# Patient Record
Sex: MALE | Race: WHITE | NOT HISPANIC OR LATINO
[De-identification: names, ages, dates, MRNs, and addresses within clinical notes are randomized per-mention and may not be internally consistent; named-entity substitution may affect disease eponyms.]

---

## 2020-08-04 ENCOUNTER — APPOINTMENT (OUTPATIENT)
Dept: OPHTHALMOLOGY | Facility: CLINIC | Age: 70
End: 2020-08-04
Payer: MEDICARE

## 2020-08-04 ENCOUNTER — NON-APPOINTMENT (OUTPATIENT)
Age: 70
End: 2020-08-04

## 2020-08-04 PROBLEM — Z00.00 ENCOUNTER FOR PREVENTIVE HEALTH EXAMINATION: Status: ACTIVE | Noted: 2020-08-04

## 2020-08-04 PROCEDURE — 92060 SENSORIMOTOR EXAMINATION: CPT

## 2020-08-04 PROCEDURE — 92004 COMPRE OPH EXAM NEW PT 1/>: CPT

## 2020-09-16 ENCOUNTER — NON-APPOINTMENT (OUTPATIENT)
Age: 70
End: 2020-09-16

## 2020-09-16 ENCOUNTER — APPOINTMENT (OUTPATIENT)
Dept: OPHTHALMOLOGY | Facility: CLINIC | Age: 70
End: 2020-09-16
Payer: MEDICARE

## 2020-09-16 PROCEDURE — 92012 INTRM OPH EXAM EST PATIENT: CPT

## 2020-10-21 ENCOUNTER — APPOINTMENT (OUTPATIENT)
Dept: OPHTHALMOLOGY | Facility: CLINIC | Age: 70
End: 2020-10-21
Payer: MEDICARE

## 2020-10-21 ENCOUNTER — NON-APPOINTMENT (OUTPATIENT)
Age: 70
End: 2020-10-21

## 2020-10-21 PROCEDURE — 92012 INTRM OPH EXAM EST PATIENT: CPT

## 2020-10-21 PROCEDURE — 92060 SENSORIMOTOR EXAMINATION: CPT

## 2020-10-21 PROCEDURE — 99072 ADDL SUPL MATRL&STAF TM PHE: CPT

## 2020-11-13 ENCOUNTER — APPOINTMENT (OUTPATIENT)
Dept: OPHTHALMOLOGY | Facility: CLINIC | Age: 70
End: 2020-11-13
Payer: MEDICARE

## 2020-11-13 ENCOUNTER — NON-APPOINTMENT (OUTPATIENT)
Age: 70
End: 2020-11-13

## 2020-11-13 PROCEDURE — 92012 INTRM OPH EXAM EST PATIENT: CPT

## 2020-11-13 PROCEDURE — 99072 ADDL SUPL MATRL&STAF TM PHE: CPT

## 2020-11-13 PROCEDURE — 92060 SENSORIMOTOR EXAMINATION: CPT

## 2020-12-14 ENCOUNTER — APPOINTMENT (OUTPATIENT)
Dept: OPHTHALMOLOGY | Facility: CLINIC | Age: 70
End: 2020-12-14
Payer: MEDICARE

## 2020-12-14 ENCOUNTER — NON-APPOINTMENT (OUTPATIENT)
Age: 70
End: 2020-12-14

## 2020-12-14 PROCEDURE — 92060 SENSORIMOTOR EXAMINATION: CPT

## 2020-12-14 PROCEDURE — 99072 ADDL SUPL MATRL&STAF TM PHE: CPT

## 2020-12-14 PROCEDURE — 99214 OFFICE O/P EST MOD 30 MIN: CPT

## 2021-08-25 ENCOUNTER — APPOINTMENT (OUTPATIENT)
Dept: OPHTHALMOLOGY | Facility: CLINIC | Age: 71
End: 2021-08-25

## 2021-09-13 ENCOUNTER — APPOINTMENT (OUTPATIENT)
Dept: OPHTHALMOLOGY | Facility: CLINIC | Age: 71
End: 2021-09-13
Payer: MEDICARE

## 2021-09-13 ENCOUNTER — NON-APPOINTMENT (OUTPATIENT)
Age: 71
End: 2021-09-13

## 2021-09-13 PROCEDURE — 92012 INTRM OPH EXAM EST PATIENT: CPT

## 2022-06-01 ENCOUNTER — NON-APPOINTMENT (OUTPATIENT)
Age: 72
End: 2022-06-01

## 2022-06-01 ENCOUNTER — APPOINTMENT (OUTPATIENT)
Dept: OPHTHALMOLOGY | Facility: CLINIC | Age: 72
End: 2022-06-01
Payer: MEDICARE

## 2022-06-01 PROCEDURE — 92012 INTRM OPH EXAM EST PATIENT: CPT

## 2022-08-30 ENCOUNTER — APPOINTMENT (OUTPATIENT)
Dept: ORTHOPEDIC SURGERY | Facility: CLINIC | Age: 72
End: 2022-08-30

## 2022-08-30 DIAGNOSIS — Z86.39 PERSONAL HISTORY OF OTHER ENDOCRINE, NUTRITIONAL AND METABOLIC DISEASE: ICD-10-CM

## 2022-08-30 DIAGNOSIS — G70.00 MYASTHENIA GRAVIS W/OUT (ACUTE) EXACERBATION: ICD-10-CM

## 2022-08-30 DIAGNOSIS — M51.9 UNSPECIFIED THORACIC, THORACOLUMBAR AND LUMBOSACRAL INTERVERTEBRAL DISC DISORDER: ICD-10-CM

## 2022-08-30 PROCEDURE — 99204 OFFICE O/P NEW MOD 45 MIN: CPT

## 2022-08-30 PROCEDURE — 72100 X-RAY EXAM L-S SPINE 2/3 VWS: CPT

## 2022-08-30 PROCEDURE — 72170 X-RAY EXAM OF PELVIS: CPT

## 2022-08-30 RX ORDER — METFORMIN HYDROCHLORIDE 1000 MG/1
1000 TABLET, COATED ORAL
Refills: 0 | Status: ACTIVE | COMMUNITY

## 2022-08-30 RX ORDER — DICLOFENAC SODIUM 75 MG/1
75 TABLET, DELAYED RELEASE ORAL
Qty: 60 | Refills: 1 | Status: ACTIVE | COMMUNITY
Start: 2022-08-30 | End: 1900-01-01

## 2022-08-30 RX ORDER — LEVOTHYROXINE SODIUM 88 UG/1
88 TABLET ORAL
Refills: 0 | Status: ACTIVE | COMMUNITY

## 2022-08-30 NOTE — HISTORY OF PRESENT ILLNESS
[Sudden] : sudden [8] : 8 [2] : 2 [Dull/Aching] : dull/aching [Walking] : walking [Lying in bed] : lying in bed [de-identified] : Patient presents with right hip pain for the past 5-6 weeks. No specific injury. Describes right glut pain that occurs mostly after rising from a sitting or laying. Denies radiating pain. Improves with activity and Tylenol #3. Denies history of prior injury. [] : no [FreeTextEntry1] : right hip [FreeTextEntry5] : no recent injury. Patient is a 71 year old male here today for ongoing right hip pain that started 5 weeks ago. Patient complains about soreness and dull pain that worsens when walking. Pain worsens at nighttime. [FreeTextEntry6] : soreness [FreeTextEntry9] : tylenol [de-identified] : none

## 2022-08-30 NOTE — PHYSICAL EXAM
[NL (30)] : left lateral rotation 30 degrees [5___] : right hamstring 5[unfilled]/5 [] : no ecchymosis [TWNoteComboBox7] : forward flexion 75 degrees [de-identified] : extension 20 degrees

## 2022-08-30 NOTE — DISCUSSION/SUMMARY
[de-identified] : The patient was advised of the diagnosis. The natural history of the pathology was explained in full to the patient in layman's terms. All questions were answered. The risks and benefits of surgical and non-surgical treatment alternatives were explained in full to the patient.\par \par \par May need mri and may need pain management. all discussed\par

## 2022-09-27 ENCOUNTER — APPOINTMENT (OUTPATIENT)
Dept: ORTHOPEDIC SURGERY | Facility: CLINIC | Age: 72
End: 2022-09-27

## 2022-09-27 VITALS — BODY MASS INDEX: 36.86 KG/M2 | HEIGHT: 63 IN | WEIGHT: 208 LBS

## 2022-09-27 PROCEDURE — 99214 OFFICE O/P EST MOD 30 MIN: CPT | Mod: 25

## 2022-09-27 PROCEDURE — J3490M: CUSTOM

## 2022-09-27 PROCEDURE — 20550 NJX 1 TENDON SHEATH/LIGAMENT: CPT

## 2022-09-27 PROCEDURE — 73130 X-RAY EXAM OF HAND: CPT | Mod: LT

## 2022-09-27 NOTE — PROCEDURE
[FreeTextEntry3] : Injection was performed in the left thumb because of pain and inflammation. Anesthesia: ethyl chloride sprayed topically. left thumb A1 pulley\par Kenalog: An injection of Kenalog 7.5 mg.\par Marcaine .25%: 1.5 cc.\par \par The risks, benefits, and alternatives to cortisone injection were explained in full to the patient. Risks outlined include but are not limited to infection, sepsis, bleeding, scarring, skin discoloration, temporary increase in pain, syncopal episode, failure to resolve symptoms, allergic reaction, and symptom recurrence. Patient understands the risks. All questions were answered. After discussion of options, patient requested an injection. Oral informed consent was obtained. Sterile technique was utilized for the procedure including the preparation of the solutions used for the injection. Injection site was prepped with betadine and alcohol. Ethyl chloride sprayed topically. Sterile technique used. Patient tolerated procedure well. \par \par Post Procedure Instructions: Patient was advised to call if redness, pain, or fever occur. Apply ice for 15 min. every 2 hours for the next 12-24 hours as tolerated. Patient was advised to rest the joint(s) for 1-2 days.\par

## 2022-09-27 NOTE — DISCUSSION/SUMMARY
[de-identified] : The patient was advised of the diagnosis. The natural history of the pathology was explained in full to the patient in layman's terms. All questions were answered. The risks and benefits of surgical and non-surgical treatment alternatives were explained in full to the patient.\par \par The risks, benefits, and alternatives to corticosteroid injection were reviewed with the patient. Risks outlined include, but are not limited to infection, sepsis, bleeding, scarring, skin discoloration, temporary increase in pain, syncopal episode, failure to resolve symptoms, symptoms recurrence, allergic reaction, flare reaction, and elevation of blood sugar in diabetics. Patient understood the risks and asked to proceed with this treatment course.\par

## 2022-09-27 NOTE — PHYSICAL EXAM
[Left] : left hand [A1-Pulley] : A1-pulley [Finger Flexion] : finger flexion [5___] : grasp 5[unfilled]/5 [1st] : 1st [] : no erythema

## 2022-09-27 NOTE — HISTORY OF PRESENT ILLNESS
[de-identified] : RHD patient presents with left thumb pain for the past 3-4 weeks. No specific injury. Describes pain at the base of the thumb that is worse with grasping. Often drops things. Denies numbness/tingling. He has been using trying ice and NSAIDs without relief. History of RA.\par \par Pain in the lower back and right hip has improved. He has been going to PT.

## 2022-09-27 NOTE — IMAGING
[Left] : left hand [There are no fractures, subluxations or dislocations. No significant abnormalities are seen] : There are no fractures, subluxations or dislocations. No significant abnormalities are seen [FreeTextEntry1] : minimal oa mid carpus

## 2022-10-13 ENCOUNTER — APPOINTMENT (OUTPATIENT)
Dept: ORTHOPEDIC SURGERY | Facility: CLINIC | Age: 72
End: 2022-10-13

## 2022-10-13 VITALS — HEIGHT: 63 IN | WEIGHT: 208 LBS | BODY MASS INDEX: 36.86 KG/M2

## 2022-10-13 PROCEDURE — 99213 OFFICE O/P EST LOW 20 MIN: CPT

## 2022-10-13 NOTE — PHYSICAL EXAM
[Left] : left hand [A1-Pulley] : A1-pulley [Finger Flexion] : finger flexion [5___] : grasp 5[unfilled]/5 [1st] : 1st [] : no erythema [de-identified] : note no definite click, x-ray no arthritic changes.

## 2022-10-13 NOTE — HISTORY OF PRESENT ILLNESS
[8] : 8 [de-identified] : Follow up today. Symptoms continue. Notes only 1-2 day of relief following CSI at the last visit (9/27/22). States no longer locking, but pain is significant. He has been wearing brace. [FreeTextEntry1] : left hand

## 2022-10-13 NOTE — DISCUSSION/SUMMARY
[de-identified] : The patient was advised of the diagnosis. The natural history of the pathology was explained in full to the patient in layman's terms. All questions were answered. The risks and benefits of surgical and non-surgical treatment alternatives were explained in full to the patient.\par \par advised to see Dr Raphael, trigger finger vs oa discussed but does have pain over A1 pulley but no definite click , did not improve from injection A1 pulley

## 2022-10-27 ENCOUNTER — APPOINTMENT (OUTPATIENT)
Dept: ORTHOPEDIC SURGERY | Facility: CLINIC | Age: 72
End: 2022-10-27

## 2022-10-27 PROCEDURE — 20550 NJX 1 TENDON SHEATH/LIGAMENT: CPT | Mod: LT

## 2022-10-27 PROCEDURE — 99214 OFFICE O/P EST MOD 30 MIN: CPT | Mod: 25

## 2022-10-27 PROCEDURE — L3809: CPT | Mod: LT

## 2022-10-27 NOTE — HISTORY OF PRESENT ILLNESS
[8] : 8 [de-identified] : 10/27/2022: Pt is a 71 year old M who presents today for consultation of their  L Thumb. Pt was seen by [facility] Dr. Nichole since 9/27/22 with left thumb pain for the prior 3-4 weeks w/o specific injury. Pain at the base of the thumb that is worse with grasping. Often drops things. Denies numbness/tingling. Ice/NSAIDs w/o relief. H/o of RA. XR showed there were no fractures, subluxations, or dislocations. No significant abnormalities were seen. Given CSI w/ only 2 days of relief, using brace. Advised to see Dr Raphael, trigger finger vs oa discussed but does have pain over A1 pulley but no definite click, did not improve from injection A1 pulley. \par \par PMH: myasthenia occula (currently on low dose prednisone), htn, RA. \par Allergies: Sulfa (rash). \par \par  [FreeTextEntry1] : left thumb

## 2022-10-27 NOTE — ASSESSMENT
[FreeTextEntry1] : The patient was advised of the diagnosis. The natural history of the pathology was explained in full to the patient in layman's terms. All questions were answered. The risks and benefits of surgical and non-surgical treatment alternatives were explained in full to the patient.\par \par Pt will wear gamekeeper

## 2022-10-27 NOTE — PROCEDURE
[Tendon Sheath] : tendon sheath [Left] : of the left [Pain] : pain [Inflammation] : inflammation [Alcohol] : alcohol [Ethyl Chloride sprayed topically] : ethyl chloride sprayed topically [Sterile technique used] : sterile technique used [___ cc    3mg] :  Betamethasone (Celestone) ~Vcc of 3mg [___ cc    1%] : Lidocaine ~Vcc of 1%  [] : Patient tolerated procedure well [Call if redness, pain or fever occur] : call if redness, pain or fever occur [Apply ice for 15min out of every hour for the next 12-24 hours as tolerated] : apply ice for 15 minutes out of every hour for the next 12-24 hours as tolerated [Previous OTC use and PT nontherapeutic] : patient has tried OTC's including aspirin, Ibuprofen, Aleve, etc or prescription NSAIDS, and/or exercises at home and/or physical therapy without satisfactory response [Patient had decreased mobility in the joint] : patient had decreased mobility in the joint [Risks, benefits, alternatives discussed / Verbal consent obtained] : the risks benefits, and alternatives have been discussed, and verbal consent was obtained [de-identified] : left thumb trigger finger csi #2

## 2022-10-27 NOTE — IMAGING
[de-identified] : Left thumb with mild ttp over the A1 pulley\par All digits are nvi with intact flexor and extensor tendon function \par , intrinsic and pinch strength is 5/5. \par Left wrist with full and painfree ROM. \par There is no ttp over this region. \par Pain with stress of UCL\par No triggering

## 2022-10-31 ENCOUNTER — RX RENEWAL (OUTPATIENT)
Age: 72
End: 2022-10-31

## 2022-11-21 ENCOUNTER — APPOINTMENT (OUTPATIENT)
Dept: ORTHOPEDIC SURGERY | Facility: CLINIC | Age: 72
End: 2022-11-21
Payer: MEDICARE

## 2022-11-21 VITALS — BODY MASS INDEX: 36.86 KG/M2 | WEIGHT: 208 LBS | HEIGHT: 63 IN

## 2022-11-21 PROCEDURE — 99213 OFFICE O/P EST LOW 20 MIN: CPT

## 2022-11-21 NOTE — PROCEDURE
[Pain] : pain [Inflammation] : inflammation [Alcohol] : alcohol [Ethyl Chloride sprayed topically] : ethyl chloride sprayed topically [Sterile technique used] : sterile technique used [___ cc    3mg] :  Betamethasone (Celestone) ~Vcc of 3mg [___ cc    1%] : Lidocaine ~Vcc of 1%  [] : Patient tolerated procedure well [Call if redness, pain or fever occur] : call if redness, pain or fever occur [Apply ice for 15min out of every hour for the next 12-24 hours as tolerated] : apply ice for 15 minutes out of every hour for the next 12-24 hours as tolerated [Previous OTC use and PT nontherapeutic] : patient has tried OTC's including aspirin, Ibuprofen, Aleve, etc or prescription NSAIDS, and/or exercises at home and/or physical therapy without satisfactory response [Patient had decreased mobility in the joint] : patient had decreased mobility in the joint [Risks, benefits, alternatives discussed / Verbal consent obtained] : the risks benefits, and alternatives have been discussed, and verbal consent was obtained

## 2022-12-02 ENCOUNTER — APPOINTMENT (OUTPATIENT)
Dept: OPHTHALMOLOGY | Facility: CLINIC | Age: 72
End: 2022-12-02

## 2022-12-15 ENCOUNTER — APPOINTMENT (OUTPATIENT)
Dept: ORTHOPEDIC SURGERY | Facility: CLINIC | Age: 72
End: 2022-12-15

## 2022-12-15 VITALS — WEIGHT: 208 LBS | BODY MASS INDEX: 36.86 KG/M2 | HEIGHT: 63 IN

## 2022-12-15 DIAGNOSIS — M18.12 UNILATERAL PRIMARY OSTEOARTHRITIS OF FIRST CARPOMETACARPAL JOINT, LEFT HAND: ICD-10-CM

## 2022-12-15 PROCEDURE — 99213 OFFICE O/P EST LOW 20 MIN: CPT

## 2022-12-15 RX ORDER — DICLOFENAC SODIUM 1 %
1 KIT TOPICAL DAILY
Qty: 1 | Refills: 2 | Status: ACTIVE | COMMUNITY
Start: 2022-12-15 | End: 1900-01-01

## 2022-12-15 NOTE — ASSESSMENT
[FreeTextEntry1] : Pt referred for custom hand base thumb spica brace for comfort.\par Rx for Voltaren gel qid provided.\par RTO in 6 weeks for f/u care.\par Will consider CSI at that time.

## 2022-12-15 NOTE — HISTORY OF PRESENT ILLNESS
[7] : 7 [6] : 6 [Burning] : burning [Dull/Aching] : dull/aching [Retired] : Work status: retired [de-identified] : 12/15/2022: pt here for f/u of left thumb MCP sprain / mild triggering. Pt states that previous bracing seemed to alleviate his pain, however this has returned over the past on 11/25/2022.\par Pt denies numbness or tingling. \par Previous note states that left thumb was provided CSI (*this was incorrect). \par \par 11/21/22:  Pt was wearing brace and is improving\par \par 10/27/2022: Pt is a 71 year old M who presents today for consultation of their  L Thumb. Pt was seen by [facility] Dr. Nichole since \par \par 9/27/22 with left thumb pain for the prior 3-4 weeks w/o specific injury (possibly related to ambulating with walking sticks). . Pain at the base of the thumb that is worse with grasping. Often drops things. Denies numbness/tingling. Ice/NSAIDs w/o relief. H/o of RA. XR showed there were no fractures, subluxations, or dislocations. No significant abnormalities were seen. Given CSI w/ only 2 days of relief, using brace. Advised to see Dr Raphael, trigger finger vs oa discussed but does have pain over A1 pulley but no definite click, did not improve from injection A1 pulley. \par \par PMH: myasthenia occula (currently on low dose prednisone), htn, RA. \par Allergies: Sulfa (rash). \par \par  [] : Post Surgical Visit: no [FreeTextEntry1] : left thumb [de-identified] : none

## 2022-12-15 NOTE — IMAGING
[de-identified] : Left thumb with  ttp over the A1 pulley and RCL of the MCP joint.\par There is mild pain with stress of the RCL and no ligamentous laxity is noted.\par All digits are nvi with intact flexor and extensor tendon function \par , intrinsic and pinch strength is 5/5. \par Left wrist with full and painfree ROM. \par There is no ttp over this region. \par No triggering

## 2022-12-16 ENCOUNTER — APPOINTMENT (OUTPATIENT)
Dept: ORTHOPEDIC SURGERY | Facility: CLINIC | Age: 72
End: 2022-12-16

## 2022-12-16 VITALS — HEIGHT: 63 IN | BODY MASS INDEX: 36.86 KG/M2 | WEIGHT: 208 LBS

## 2022-12-16 DIAGNOSIS — S80.01XA CONTUSION OF RIGHT KNEE, INITIAL ENCOUNTER: ICD-10-CM

## 2022-12-16 PROCEDURE — J3490M: CUSTOM | Mod: RT

## 2022-12-16 PROCEDURE — 20611 DRAIN/INJ JOINT/BURSA W/US: CPT | Mod: RT

## 2022-12-16 PROCEDURE — 73562 X-RAY EXAM OF KNEE 3: CPT | Mod: RT

## 2022-12-16 PROCEDURE — 99214 OFFICE O/P EST MOD 30 MIN: CPT | Mod: 25

## 2022-12-16 NOTE — PHYSICAL EXAM
[NL (0)] : extension 0 degrees [5___] : hamstring 5[unfilled]/5 [] : non-antalgic [Right] : right knee [Degenerative change] : Degenerative change [FreeTextEntry3] : healed abrasion noted anteriorly  [TWNoteComboBox7] : flexion 125 degrees

## 2022-12-16 NOTE — DISCUSSION/SUMMARY
[de-identified] : Progress Note completed by Yarelis Barnes PA-C\par * Dr. Escalona -- The documentation recorded in this note accurately reflects the decisions made by me during this visit.

## 2022-12-16 NOTE — HISTORY OF PRESENT ILLNESS
[4] : 4 [3] : 3 [Burning] : burning [Dull/Aching] : dull/aching [Localized] : localized [Throbbing] : throbbing [de-identified] : 12/16/22:  new onset of right knee pain after a fall directly on knee end november 2022.   [] : no [FreeTextEntry1] : right knee

## 2022-12-16 NOTE — PROCEDURE
[Large Joint Injection] : Large joint injection [Right] : of the right [Knee] : knee [Pain] : pain [Inflammation] : inflammation [Alcohol] : alcohol [Betadine] : betadine [___ cc    3mg] :  Betamethasone (Celestone) ~Vcc of 3mg [___ cc    1%] : Lidocaine ~Vcc of 1%  [___ cc    0.25%] : Bupivacaine (Marcaine) ~Vcc of 0.25%  [] : Patient tolerated procedure well [Call if redness, pain or fever occur] : call if redness, pain or fever occur [Apply ice for 15min out of every hour for the next 12-24 hours as tolerated] : apply ice for 15 minutes out of every hour for the next 12-24 hours as tolerated [Patient was advised to rest the joint(s) for ____ days] : patient was advised to rest the joint(s) for [unfilled] days [Previous OTC use and PT nontherapeutic] : patient has tried OTC's including aspirin, Ibuprofen, Aleve, etc or prescription NSAIDS, and/or exercises at home and/or physical therapy without satisfactory response [Patient had decreased mobility in the joint] : patient had decreased mobility in the joint [Risks, benefits, alternatives discussed / Verbal consent obtained] : the risks benefits, and alternatives have been discussed, and verbal consent was obtained [All ultrasound images have been permanently captured and stored accordingly in our picture archiving and communication system] : All ultrasound images have been permanently captured and stored accordingly in our picture archiving and communication system [Visualization of the needle and placement of injection was performed without complication] : visualization of the needle and placement of injection was performed without complication [Isolate area of inflammation] : isolate area of inflammation [de-identified] : for accurate placement of needle into knee joint negative

## 2022-12-16 NOTE — ASSESSMENT
[FreeTextEntry1] : new onset of right knee pain after a fall directly on knee end november 2022.  history of right knee scope for pmm, plm approx 8-9 years ago by outside ortho doc - was okay until recently.  medial and anterior knee pain.  possible contusion, possible oa exacerbation.  possible mmt.\par \par history of dm - last was 130

## 2023-01-09 ENCOUNTER — APPOINTMENT (OUTPATIENT)
Dept: ORTHOPEDIC SURGERY | Facility: CLINIC | Age: 73
End: 2023-01-09
Payer: MEDICARE

## 2023-01-09 VITALS — HEIGHT: 63 IN | WEIGHT: 208 LBS | BODY MASS INDEX: 36.86 KG/M2

## 2023-01-09 DIAGNOSIS — S63.642A SPRAIN OF METACARPOPHALANGEAL JOINT OF LEFT THUMB, INITIAL ENCOUNTER: ICD-10-CM

## 2023-01-09 DIAGNOSIS — M65.312 TRIGGER THUMB, LEFT THUMB: ICD-10-CM

## 2023-01-09 PROCEDURE — 99213 OFFICE O/P EST LOW 20 MIN: CPT

## 2023-01-09 NOTE — IMAGING
[de-identified] : Left thumb with no ttp over the A1 pulley and RCL of the MCP joint.\par No pain with stress of RCL\par Mild thumb stiffness\par All digits are nvi with intact flexor and extensor tendon function \par , intrinsic and pinch strength is 5/5. \par Left wrist with full and painfree ROM. \par There is no ttp over this region. \par No triggering

## 2023-01-09 NOTE — HISTORY OF PRESENT ILLNESS
[de-identified] : 1/9/23:  Pt has been wearing brace and no longer has pain.\par \par 12/15/2022: pt here for f/u of left thumb MCP sprain / mild triggering. Pt states that previous bracing seemed to alleviate his pain, however this has returned over the past on 11/25/2022.\par Pt denies numbness or tingling. \par Previous note states that left thumb was provided CSI (*this was incorrect). \par \par 11/21/22:  Pt was wearing brace and is improving\par \par 10/27/2022: Pt is a 71 year old M who presents today for consultation of their  L Thumb. Pt was seen by [facility] Dr. Nichole since \par \par 9/27/22 with left thumb pain for the prior 3-4 weeks w/o specific injury (possibly related to ambulating with walking sticks). . Pain at the base of the thumb that is worse with grasping. Often drops things. Denies numbness/tingling. Ice/NSAIDs w/o relief. H/o of RA. XR showed there were no fractures, subluxations, or dislocations. No significant abnormalities were seen. Given CSI w/ only 2 days of relief, using brace. Advised to see Dr Raphael, trigger finger vs oa discussed but does have pain over A1 pulley but no definite click, did not improve from injection A1 pulley. \par \par PMH: myasthenia occula (currently on low dose prednisone), htn, RA. \par Allergies: Sulfa (rash). \par \par

## 2023-01-13 ENCOUNTER — APPOINTMENT (OUTPATIENT)
Dept: ORTHOPEDIC SURGERY | Facility: CLINIC | Age: 73
End: 2023-01-13
Payer: MEDICARE

## 2023-01-13 VITALS — BODY MASS INDEX: 38.28 KG/M2 | HEIGHT: 62 IN | WEIGHT: 208 LBS

## 2023-01-13 DIAGNOSIS — M77.12 LATERAL EPICONDYLITIS, LEFT ELBOW: ICD-10-CM

## 2023-01-13 PROCEDURE — 73080 X-RAY EXAM OF ELBOW: CPT | Mod: LT

## 2023-01-13 PROCEDURE — 99214 OFFICE O/P EST MOD 30 MIN: CPT | Mod: 25

## 2023-01-13 PROCEDURE — 20551 NJX 1 TENDON ORIGIN/INSJ: CPT | Mod: LT

## 2023-01-13 NOTE — DISCUSSION/SUMMARY
[de-identified] : Progress Note completed by Yarelis Barnes PA-C\par * Dr. Escalona -- The documentation recorded in this note accurately reflects the decisions made by me during this visit.

## 2023-01-13 NOTE — HISTORY OF PRESENT ILLNESS
[7] : 7 [5] : 5 [Dull/Aching] : dull/aching [Localized] : localized [Throbbing] : throbbing [de-identified] : 12/16/22:  new onset of right knee pain after a fall directly on knee end november 2022.  \par 1/13/23:  right knee improving with pt and csi.  left lateral elbow pain now.  [] : no [FreeTextEntry1] : left elbow, right knee  [de-identified] : pt-right knee

## 2023-01-13 NOTE — PROCEDURE
[FreeTextEntry3] : Procedure Name: Tendon Origin Injection: Celestone and Lidocaine\par \par Large Joint Injection was performed because of pain and inflammation. Anesthesia: ethyl chloride sprayed topically.. \par Celestone: An injection of Celestone 6 mg , 1 cc. \par Lidocaine: 2 cc. \par \par Patient has tried OTC's including aspirin, Ibuprofen, Aleve etc or prescription NSAIDS, and/or exercises at home and/ or physical therapy without satisfactory response and Patient has decreased mobility in the joint. The risks, benefits, and alternatives to cortisone injection were explained in full to the patient. Risks outlined include but are not limited to infection, sepsis, bleeding, scarring, skin discoloration, temporary increase in pain, syncopal episode, failure to resolve symptoms, allergic reaction, symptom recurrence, and elevation of blood sugar in diabetics. Patient understood the risks. All questions were answered.   Oral informed consent was obtained.   Sterile technique was utilized for the procedure including the preparation of the solutions used for the injection. Medication was injected into LEFT LATERAL EPICONDYLE   Patient tolerated the procedure well. Advised to ice the injection site this evening.  Post Procedure Instructions: Patient was advised to call if redness, pain, or fever occur and apply ice for 15 min. out of every hour for the next 12-24 hours as tolerated. patient was advised to rest the joint(s) for 2 days.

## 2023-01-13 NOTE — ASSESSMENT
[FreeTextEntry1] : new onset of right knee pain after a fall directly on knee end november 2022.  history of right knee scope for pmm, plm approx 8-9 years ago by outside ortho doc - was okay until recently.  medial and anterior knee pain.  possible contusion, possible oa exacerbation.  possible mmt.  improved with csi on 12/16/22 and pt. \par \par left lateral elbow pain with tennis elbow. \par \par history of dm - last was 130

## 2023-01-13 NOTE — PHYSICAL EXAM
[Left] : left elbow [4___] : supination 4[unfilled]/5 [NL (0)] : extension 0 degrees [5___] : hamstring 5[unfilled]/5 [Right] : right knee [Degenerative change] : Degenerative change [There are no fractures, subluxations or dislocations. No significant abnormalities are seen] : There are no fractures, subluxations or dislocations. No significant abnormalities are seen [FreeTextEntry1] : calcifications noted laterally and medially.  [TWNoteComboBox6] : pronation 90 degrees [de-identified] : supination 80 degrees [] : non-antalgic [FreeTextEntry3] : healed abrasion noted anteriorly  [TWNoteComboBox7] : flexion 125 degrees

## 2023-02-24 ENCOUNTER — APPOINTMENT (OUTPATIENT)
Dept: ORTHOPEDIC SURGERY | Facility: CLINIC | Age: 73
End: 2023-02-24

## 2023-03-29 NOTE — HISTORY OF PRESENT ILLNESS
[de-identified] : 11/21/22:  Pt was wearing brace and is improving\par \par 10/27/2022: Pt is a 71 year old M who presents today for consultation of their  L Thumb. Pt was seen by [facility] Dr. Nichole since \par \par 9/27/22 with left thumb pain for the prior 3-4 weeks w/o specific injury. Pain at the base of the thumb that is worse with grasping. Often drops things. Denies numbness/tingling. Ice/NSAIDs w/o relief. H/o of RA. XR showed there were no fractures, subluxations, or dislocations. No significant abnormalities were seen. Given CSI w/ only 2 days of relief, using brace. Advised to see Dr Raphael, trigger finger vs oa discussed but does have pain over A1 pulley but no definite click, did not improve from injection A1 pulley. \par \par PMH: myasthenia occula (currently on low dose prednisone), htn, RA. \par Allergies: Sulfa (rash). \par \par  [] : Post Surgical Visit: no [de-identified] : none

## 2023-03-29 NOTE — IMAGING
[de-identified] : Left thumb with no ttp over the A1 pulley\par All digits are nvi with intact flexor and extensor tendon function \par , intrinsic and pinch strength is 5/5. \par Left wrist with full and painfree ROM. \par There is no ttp over this region. \par Mild pain with stress of UCL\par No triggering

## 2023-03-29 NOTE — ASSESSMENT
[FreeTextEntry1] : The patient was advised of the diagnosis. The natural history of the pathology was explained in full to the patient in layman's terms. All questions were answered. The risks and benefits of surgical and non-surgical treatment alternatives were explained in full to the patient.\par \par Pt no longer needs to wear brace.

## 2023-04-28 ENCOUNTER — APPOINTMENT (OUTPATIENT)
Dept: ORTHOPEDIC SURGERY | Facility: CLINIC | Age: 73
End: 2023-04-28
Payer: MEDICARE

## 2023-04-28 VITALS — WEIGHT: 208 LBS | BODY MASS INDEX: 38.28 KG/M2 | HEIGHT: 62 IN

## 2023-04-28 PROCEDURE — J3490M: CUSTOM | Mod: RT

## 2023-04-28 PROCEDURE — 73030 X-RAY EXAM OF SHOULDER: CPT | Mod: RT

## 2023-04-28 PROCEDURE — 99214 OFFICE O/P EST MOD 30 MIN: CPT | Mod: 25

## 2023-04-28 PROCEDURE — 20611 DRAIN/INJ JOINT/BURSA W/US: CPT | Mod: RT

## 2023-04-28 NOTE — REASON FOR VISIT
[FreeTextEntry2] : right shoulder pain happened 3 weeks ago when patient a felt pop when grabbing something behind the couch.

## 2023-04-28 NOTE — DISCUSSION/SUMMARY
[de-identified] : Progress note completed by Margret GUERRERO under the direct supervision of Edilson Escalona MD

## 2023-04-28 NOTE — PHYSICAL EXAM
[5___] : internal rotation 5[unfilled]/5 [4 ___] : forward flexion 4[unfilled]/5 [4___] : abduction 4[unfilled]/5 [] : no sensory deficits [Right] : right shoulder [Type 3 acromion] : Type 3 acromion [Cephalic migration of humeral head] : Cephalic migration of humeral head [TWNoteComboBox7] : active forward flexion 140 degrees [TWNoteComboBox6] : internal rotation L5 [de-identified] : external rotation 70 degrees

## 2023-04-28 NOTE — HISTORY OF PRESENT ILLNESS
[6] : 6 [Localized] : localized [Sharp] : sharp [Occasional] : occasional [Nothing helps with pain getting better] : Nothing helps with pain getting better [Lying in bed] : lying in bed [de-identified] : 12/16/22:  new onset of right knee pain after a fall directly on knee end november 2022.  \par 1/13/23:  right knee improving with pt and csi.  left lateral elbow pain now. \par 4/28/23:  new onset right shoulder pain, reached back and felt a "pop" [] : no [FreeTextEntry1] : right shoulder

## 2023-04-28 NOTE — PROCEDURE
[FreeTextEntry3] : Procedure Name: Large Joint Injection / Aspiration: Celestone, Lidocaine and Marcaine with Ultrasound Evaluation and Guidance\par \par Large Joint Injection was performed because of pain and inflammation. Anesthesia: ethyl chloride sprayed topically.\par Celestone: An injection of Celestone 6 mg , 1 cc.\par Lidocaine 1%:  3cc\par Marcaine 0.25%:  3cc\par \par Patient has tried OTC's including aspirin, Ibuprofen, Aleve etc or prescription NSAIDS, and/or exercises at home and/ or physical therapy without satisfactory response and Patient has decreased mobility in the joint. The risks, benefits, and alternatives to cortisone injection were explained in full to the patient. Risks outlined include but are not limited to infection, sepsis, bleeding, scarring, skin discoloration, temporary increase in pain, syncopal episode, failure to resolve symptoms, allergic reaction, symptom recurrence, and elevation of blood sugar in diabetics. Patient understood the risks. All questions were answered.   Oral informed consent was obtained.   Sterile technique was utilized for the procedure including the preparation of the solutions used for the injection. Medication was injected into RIGHT SHOULDER/GH JOINT.   Patient tolerated the procedure well. Advised to ice the injection site this evening.  Post Procedure Instructions: Patient was advised to call if redness, pain, or fever occur and apply ice for 15 min. out of every hour for the next 12-24 hours as tolerated. patient was advised to rest the joint(s) for 2 days. \par \par Ultrasound Extremity (87172) was used because of the following reasons: inflammation.\par Ultrasound Guidance was used for the following reasons: for accurate placement of needle into shoulder/glenohumeral joint.\par Diagnostic ultrasound was performed of the shoulder and is positive for synovitis.\par Ultrasound guided injection was performed of the shoulder, visualization of the needle and placement of injection was performed without complication.\par \par

## 2023-04-28 NOTE — ASSESSMENT
[FreeTextEntry1] : new right shoulder pain after reaching behind. concern for RTC tear.\par \par right knee pain after a fall directly on knee end november 2022.  history of right knee scope for pmm, plm approx 8-9 years ago by outside ortho doc - was okay until recently.  medial and anterior knee pain.  possible contusion, possible oa exacerbation.  possible mmt.  improved with csi on 12/16/22 and pt. \par \par left lateral elbow pain with tennis elbow. \par \par history of dm - last was 130

## 2023-05-02 ENCOUNTER — FORM ENCOUNTER (OUTPATIENT)
Age: 73
End: 2023-05-02

## 2023-05-03 ENCOUNTER — APPOINTMENT (OUTPATIENT)
Dept: MRI IMAGING | Facility: CLINIC | Age: 73
End: 2023-05-03
Payer: MEDICARE

## 2023-05-03 PROCEDURE — 73221 MRI JOINT UPR EXTREM W/O DYE: CPT | Mod: RT

## 2023-05-11 ENCOUNTER — APPOINTMENT (OUTPATIENT)
Dept: ORTHOPEDIC SURGERY | Facility: CLINIC | Age: 73
End: 2023-05-11
Payer: MEDICARE

## 2023-05-11 VITALS — HEIGHT: 62 IN | WEIGHT: 208 LBS | BODY MASS INDEX: 38.28 KG/M2

## 2023-05-11 PROCEDURE — 99214 OFFICE O/P EST MOD 30 MIN: CPT

## 2023-05-11 NOTE — HISTORY OF PRESENT ILLNESS
[4] : 4 [Dull/Aching] : dull/aching [de-identified] : 05/11/2023 Mr. KATIHA BECKWITH, a 72 year old male, presents today for right shoulder. Pt of Dr. Escalona's presents today to review MRI results. Reports continued pain most aggravated when reaching OH or behind.  [] : no [FreeTextEntry1] : Rt Shoulder  [FreeTextEntry3] : 04/28/2023 [FreeTextEntry5] : MRI results: Rt Shoulder. Rt shoulder pain patient felt a pop when grabbing something behind the couch.  [de-identified] : XR [de-identified] : MRI

## 2023-05-11 NOTE — DISCUSSION/SUMMARY
[de-identified] : 72m with right shoulder rotator cuff tendinitis, bursitis. \par 1) start physical therapy \par 2) cryotherapy, rest and activity modification\par 3) rtc 4-6 weeks with Dr. Escalona\par \par Entered by Estefanía Calix acting as scribe.\par Dr. Whaley-\par The documentation recorded by the scribe accurately reflects the service I personally performed and the decisions made by me.

## 2023-05-11 NOTE — DATA REVIEWED
[MRI] : MRI [Right] : of the right [Shoulder] : shoulder [Report was reviewed and noted in the chart] : The report was reviewed and noted in the chart [I independently reviewed and interpreted images and report] : I independently reviewed and interpreted images and report [I reviewed the films/CD] : I reviewed the films/CD [FreeTextEntry1] : 05.03.23\par 1. Moderate diffuse supraspinatus tendinopathy and bursal-sided fraying with moderate to severe surrounding bursitis without tear, potentially related to occult calcific tendinitis or traumatic injury,. Clinical correlation and follow up is recommended.\par 2. Superior labral tearing with surrounding synovitis, mild effusion and capsulitis.\par 3. Infraspinatus tendinopathy, subscapularis tendinopathy, biceps tenosynovitis and AC joint arthrosis.\par 4. No acute fracture or disproportionate muscle atrophy.

## 2023-05-26 ENCOUNTER — APPOINTMENT (OUTPATIENT)
Dept: ORTHOPEDIC SURGERY | Facility: CLINIC | Age: 73
End: 2023-05-26
Payer: MEDICARE

## 2023-05-26 VITALS — BODY MASS INDEX: 38.28 KG/M2 | HEIGHT: 62 IN | WEIGHT: 208 LBS

## 2023-05-26 DIAGNOSIS — S46.911A STRAIN OF UNSPECIFIED MUSCLE, FASCIA AND TENDON AT SHOULDER AND UPPER ARM LEVEL, RIGHT ARM, INITIAL ENCOUNTER: ICD-10-CM

## 2023-05-26 PROCEDURE — 99214 OFFICE O/P EST MOD 30 MIN: CPT

## 2023-05-26 NOTE — PHYSICAL EXAM
[4 ___] : forward flexion 4[unfilled]/5 [Type 3 acromion] : Type 3 acromion [Cephalic migration of humeral head] : Cephalic migration of humeral head [Right] : right knee [NL (0)] : extension 0 degrees [5___] : hamstring 5[unfilled]/5 [Equivocal] : equivocal Taylor [TWNoteComboBox6] : internal rotation L5 [de-identified] : external rotation 40 degrees [4___] : quadriceps 4[unfilled]/5 [] : non-antalgic [TWNoteComboBox7] : flexion 130 degrees

## 2023-05-26 NOTE — ASSESSMENT
[FreeTextEntry1] : right shoulder pain after reaching behind. csi on 4/28/23. mri 2023 shows bursal side fraying, inflammation with possible calcific tendonitis.  doing much better with hep and PT.\par \par recurrent medial knee pain with pop.  possible new mmt.  mild oa present. history of right knee scope for pmm, plm. last csi on 12/16/22.  possibe new mmt.\par \par left lateral elbow pain with tennis elbow. \par \par history of dm - last was 167

## 2023-05-26 NOTE — HISTORY OF PRESENT ILLNESS
[6] : 6 [Localized] : localized [Tightness] : tightness [Constant] : constant [de-identified] : 12/16/22:  new onset of right knee pain after a fall directly on knee end november 2022.  \par 1/13/23:  right knee improving with pt and csi.  left lateral elbow pain now. \par 4/28/23:  new onset right shoulder pain, reached back and felt a "pop"\par 5/2623:  shoulder and elbow better.  right knee feeling worse and felt a pop i nknee. [] : no [FreeTextEntry1] : right knee  [FreeTextEntry6] : giving out   [de-identified] : PT

## 2023-05-26 NOTE — DISCUSSION/SUMMARY
[de-identified] : Progress note completed by Margret GUERRERO under the direct supervision of Edilson Escalona MD

## 2023-05-31 ENCOUNTER — FORM ENCOUNTER (OUTPATIENT)
Age: 73
End: 2023-05-31

## 2023-06-01 ENCOUNTER — APPOINTMENT (OUTPATIENT)
Dept: MRI IMAGING | Facility: CLINIC | Age: 73
End: 2023-06-01
Payer: MEDICARE

## 2023-06-01 PROCEDURE — 73721 MRI JNT OF LWR EXTRE W/O DYE: CPT | Mod: RT

## 2023-06-09 ENCOUNTER — APPOINTMENT (OUTPATIENT)
Dept: ORTHOPEDIC SURGERY | Facility: CLINIC | Age: 73
End: 2023-06-09

## 2023-06-09 ENCOUNTER — APPOINTMENT (OUTPATIENT)
Dept: ORTHOPEDIC SURGERY | Facility: CLINIC | Age: 73
End: 2023-06-09
Payer: MEDICARE

## 2023-06-09 VITALS — WEIGHT: 208 LBS | BODY MASS INDEX: 38.28 KG/M2 | HEIGHT: 62 IN

## 2023-06-09 PROCEDURE — 99214 OFFICE O/P EST MOD 30 MIN: CPT | Mod: 25

## 2023-06-09 PROCEDURE — J3490M: CUSTOM

## 2023-06-09 PROCEDURE — 20611 DRAIN/INJ JOINT/BURSA W/US: CPT | Mod: RT

## 2023-06-09 NOTE — ASSESSMENT
[FreeTextEntry1] : right shoulder pain after reaching behind. csi on 4/28/23. mri 2023 shows bursal side fraying, inflammation with possible calcific tendonitis.  doing much better with hep and PT.\par \par recurrent medial knee pain with pop. . history of right knee scope for pmm, plm. last csi on 12/16/22.  mri shows small lmt, oa, inflammation.\par \par left lateral elbow pain with tennis elbow. \par \par dm. good sugars today.

## 2023-06-09 NOTE — DISCUSSION/SUMMARY
[de-identified] : Progress note completed by Margret GUERRERO under the direct supervision of Edilson Escalona MD

## 2023-06-09 NOTE — DATA REVIEWED
[MRI] : MRI [Right] : of the right [Report was reviewed and noted in the chart] : The report was reviewed and noted in the chart [I reviewed the films/CD] : I reviewed the films/CD [Knee] : knee

## 2023-06-09 NOTE — HISTORY OF PRESENT ILLNESS
[6] : 6 [Localized] : localized [Tightness] : tightness [Constant] : constant [Burning] : burning [Dull/Aching] : dull/aching [Sharp] : sharp [Shooting] : shooting [Throbbing] : throbbing [de-identified] : 12/16/22:  new onset of right knee pain after a fall directly on knee end november 2022.  \par 1/13/23:  right knee improving with pt and csi.  left lateral elbow pain now. \par 4/28/23:  new onset right shoulder pain, reached back and felt a "pop"\par 5/2623:  shoulder and elbow better.  right knee feeling worse and felt a pop i nknee.\par 6/9/23:  right knee pain persists.  here for mri. [] : no [FreeTextEntry1] : right knee  [FreeTextEntry6] : giving out   [de-identified] : PT

## 2023-06-09 NOTE — PHYSICAL EXAM
[4 ___] : forward flexion 4[unfilled]/5 [Type 3 acromion] : Type 3 acromion [Cephalic migration of humeral head] : Cephalic migration of humeral head [Right] : right knee [NL (0)] : extension 0 degrees [4___] : quadriceps 4[unfilled]/5 [5___] : hamstring 5[unfilled]/5 [Equivocal] : equivocal Taylor [TWNoteComboBox6] : internal rotation L5 [de-identified] : external rotation 40 degrees [] : non-antalgic [TWNoteComboBox7] : flexion 115 degrees

## 2023-06-22 ENCOUNTER — APPOINTMENT (OUTPATIENT)
Dept: ORTHOPEDIC SURGERY | Facility: CLINIC | Age: 73
End: 2023-06-22
Payer: MEDICARE

## 2023-06-22 VITALS — HEIGHT: 62 IN | BODY MASS INDEX: 38.28 KG/M2 | WEIGHT: 208 LBS

## 2023-06-22 PROCEDURE — 99213 OFFICE O/P EST LOW 20 MIN: CPT

## 2023-06-22 NOTE — DATA REVIEWED
[MRI] : MRI [Right] : of the right [Report was reviewed and noted in the chart] : The report was reviewed and noted in the chart [I independently reviewed and interpreted images and report] : I independently reviewed and interpreted images and report [I reviewed the films/CD] : I reviewed the films/CD [Knee] : knee [FreeTextEntry1] : 06.01.23\par 1. Findings suspicious for inflammatory arthropathy with diffuse capsulitis, effusion and proliferative synovitis along with effusion, soft tissue swelling and muscle strains surrounding the entire knee.\par 2. Findings suggesting partial lateral meniscectomy and the possibility of recurrent lateral meniscal tear \par particularly involving the body should be considered.\par 3. Medial meniscal degeneration.\par 4. Tricompartmental chondral loss, joint space narrowing and osteophytes.\par 5. Chronic ligament sprains and MCL laxity with extensor mechanism tendinopathy.\par 6. Clinical correlation regarding prior surgical history is recommended. Inflammatory arthropathy/infectious \par arthrosis including Lyme's disease should be excluded clinically.

## 2023-06-22 NOTE — HISTORY OF PRESENT ILLNESS
[1] : 2 [Dull/Aching] : dull/aching [de-identified] : 6/22/23: Here to f/up right shoulder. Attending PT and has done acupuncture both with good relief of the shoulder pain. Also reports continued right knee pain and stiffness, has questions regarding prp. Presently on a course of steroids from another physician. History of right knee arthroscopy approx 5 years ago. \par 05/11/2023 Mr. KATHIA BECKWITH, a 72 year old male, presents today for right shoulder. Pt of Dr. Escalona's presents today to review MRI results. Reports continued pain most aggravated when reaching OH or behind.  [] : no [FreeTextEntry1] : Rt Shoulder  [FreeTextEntry3] : 04/28/2023 [FreeTextEntry5] : F/U: Rt Shoulder. Rt shoulder pain Improving. ROM Improved. Pt was compliant with physical therapy went to five session felt relief but started doing acupuncture. Pt felt Improvement with acupuncture. [de-identified] : XR [de-identified] : Acupuncture

## 2023-06-22 NOTE — DISCUSSION/SUMMARY
[de-identified] : \par 72m with right knee djd\par Answered questions re: prp for the right knee.  Explained that it should not be done while he is taking steroids for another issue, would recommend waiting at least one month after the steroids are completed to schedule for a prp injection. \par \par The risks, benefits, and alternatives to platelet rich plasma injection along with a discussion regarding its possible efficacy were reviewed with the patient.  Risks outlined include but are not limited to bruising, bleeding, temporary increase in pain, infection, syncopal episode, failure to resolve symptoms and symptoms recurrence.  Patient understood the risks and asked to proceed with this treatment course \par \par Entered by Estefanía Calix acting as scribe.\par Dr. Whaley-\par The documentation recorded by the scribe accurately reflects the service I personally performed and the decisions made by me.

## 2023-06-22 NOTE — PHYSICAL EXAM
[Right] : right knee [NL (0)] : extension 0 degrees [] : ambulation with cane [TWNoteComboBox7] : flexion 120 degrees

## 2023-07-07 ENCOUNTER — APPOINTMENT (OUTPATIENT)
Dept: ORTHOPEDIC SURGERY | Facility: CLINIC | Age: 73
End: 2023-07-07
Payer: MEDICARE

## 2023-07-07 VITALS — BODY MASS INDEX: 38.28 KG/M2 | WEIGHT: 208 LBS | HEIGHT: 62 IN

## 2023-07-07 DIAGNOSIS — M75.81 OTHER SHOULDER LESIONS, RIGHT SHOULDER: ICD-10-CM

## 2023-07-07 PROCEDURE — 99213 OFFICE O/P EST LOW 20 MIN: CPT

## 2023-07-07 NOTE — REASON FOR VISIT
[FreeTextEntry2] : Rt Knee Follow up. Pt c/w physical therapy going twice a week and feels like it's helping. Pt pain Improving. Rt Knee swelling Improved has gone down since LV.

## 2023-07-07 NOTE — HISTORY OF PRESENT ILLNESS
[4] : 4 [Sharp] : sharp [de-identified] : 12/16/22:  new onset of right knee pain after a fall directly on knee end november 2022.  \par 1/13/23:  right knee improving with pt and csi.  left lateral elbow pain now. \par 4/28/23:  new onset right shoulder pain, reached back and felt a "pop"\par 5/2623:  shoulder and elbow better.  right knee feeling worse and felt a pop i nknee.\par 6/9/23:  right knee pain persists.  here for mri.\par 7/7/23: improving with hep and PT.  [FreeTextEntry1] : Rt Knee [de-identified] : Physical Therapy

## 2023-07-07 NOTE — ASSESSMENT
[FreeTextEntry1] : right shoulder pain after reaching behind. csi on 4/28/23. mri 2023 shows bursal side fraying, inflammation with possible calcific tendonitis.  doing much better with hep and PT.\par \par recurrent medial knee pain with pop. . history of right knee scope for pmm, plm. last csi on 12/16/22.  mri shows small lmt, oa, inflammation.  csi on 6/9/23.  improved.\par \par left lateral elbow pain with tennis elbow. \par \par dm. good sugars today.

## 2023-07-07 NOTE — PHYSICAL EXAM
[4 ___] : forward flexion 4[unfilled]/5 [Cephalic migration of humeral head] : Cephalic migration of humeral head [Right] : right knee [NL (0)] : extension 0 degrees [4___] : quadriceps 4[unfilled]/5 [5___] : hamstring 5[unfilled]/5 [Equivocal] : equivocal Taylor [TWNoteComboBox6] : internal rotation L5 [de-identified] : external rotation 40 degrees [] : non-antalgic [TWNoteComboBox7] : flexion 120 degrees

## 2023-07-07 NOTE — DISCUSSION/SUMMARY
[de-identified] : Progress Note completed by Yarelis Barnes PA-C\par * Dr. Escalona -- The documentation recorded in this note accurately reflects the decisions made by me during this visit.

## 2023-10-26 ENCOUNTER — APPOINTMENT (OUTPATIENT)
Dept: ORTHOPEDIC SURGERY | Facility: CLINIC | Age: 73
End: 2023-10-26
Payer: MEDICARE

## 2023-10-26 VITALS — BODY MASS INDEX: 38.28 KG/M2 | WEIGHT: 208 LBS | HEIGHT: 62 IN

## 2023-10-26 PROCEDURE — 99213 OFFICE O/P EST LOW 20 MIN: CPT

## 2023-10-27 RX ORDER — HYALURONATE SODIUM 30 MG/2 ML
30 SYRINGE (ML) INTRAARTICULAR
Qty: 4 | Refills: 0 | Status: ACTIVE | COMMUNITY
Start: 2023-10-27 | End: 1900-01-01

## 2023-11-16 ENCOUNTER — APPOINTMENT (OUTPATIENT)
Dept: ORTHOPEDIC SURGERY | Facility: CLINIC | Age: 73
End: 2023-11-16
Payer: MEDICARE

## 2023-11-16 VITALS — HEIGHT: 62 IN | WEIGHT: 208 LBS | BODY MASS INDEX: 38.28 KG/M2

## 2023-11-16 PROCEDURE — 20611 DRAIN/INJ JOINT/BURSA W/US: CPT | Mod: RT

## 2023-11-16 PROCEDURE — 99212 OFFICE O/P EST SF 10 MIN: CPT | Mod: 25

## 2023-11-22 ENCOUNTER — APPOINTMENT (OUTPATIENT)
Dept: ORTHOPEDIC SURGERY | Facility: CLINIC | Age: 73
End: 2023-11-22
Payer: MEDICARE

## 2023-11-22 VITALS — HEIGHT: 62 IN | BODY MASS INDEX: 38.28 KG/M2 | WEIGHT: 208 LBS

## 2023-11-22 PROCEDURE — 99212 OFFICE O/P EST SF 10 MIN: CPT | Mod: 25

## 2023-11-22 PROCEDURE — 20611 DRAIN/INJ JOINT/BURSA W/US: CPT | Mod: RT

## 2023-11-28 ENCOUNTER — APPOINTMENT (OUTPATIENT)
Dept: ORTHOPEDIC SURGERY | Facility: CLINIC | Age: 73
End: 2023-11-28
Payer: MEDICARE

## 2023-11-28 VITALS — WEIGHT: 208 LBS | HEIGHT: 62 IN | BODY MASS INDEX: 38.28 KG/M2

## 2023-11-28 PROCEDURE — 99212 OFFICE O/P EST SF 10 MIN: CPT | Mod: 25

## 2023-11-28 PROCEDURE — 20611 DRAIN/INJ JOINT/BURSA W/US: CPT | Mod: RT

## 2023-12-05 ENCOUNTER — APPOINTMENT (OUTPATIENT)
Dept: ORTHOPEDIC SURGERY | Facility: CLINIC | Age: 73
End: 2023-12-05
Payer: MEDICARE

## 2023-12-05 VITALS — BODY MASS INDEX: 38.28 KG/M2 | HEIGHT: 62 IN | WEIGHT: 208 LBS

## 2023-12-05 DIAGNOSIS — M17.11 UNILATERAL PRIMARY OSTEOARTHRITIS, RIGHT KNEE: ICD-10-CM

## 2023-12-05 PROCEDURE — 20611 DRAIN/INJ JOINT/BURSA W/US: CPT | Mod: RT

## 2023-12-05 PROCEDURE — 99212 OFFICE O/P EST SF 10 MIN: CPT | Mod: 25

## 2024-02-01 ENCOUNTER — RX RENEWAL (OUTPATIENT)
Age: 74
End: 2024-02-01

## 2024-02-01 RX ORDER — DICLOFENAC SODIUM 75 MG/1
75 TABLET, DELAYED RELEASE ORAL
Qty: 60 | Refills: 2 | Status: ACTIVE | COMMUNITY
Start: 2023-05-26 | End: 1900-01-01

## 2024-10-22 ENCOUNTER — APPOINTMENT (OUTPATIENT)
Dept: ORTHOPEDIC SURGERY | Facility: CLINIC | Age: 74
End: 2024-10-22